# Patient Record
Sex: MALE | Race: WHITE | URBAN - NONMETROPOLITAN AREA
[De-identification: names, ages, dates, MRNs, and addresses within clinical notes are randomized per-mention and may not be internally consistent; named-entity substitution may affect disease eponyms.]

---

## 2019-07-25 ENCOUNTER — IMPORTED ENCOUNTER (OUTPATIENT)
Dept: URBAN - NONMETROPOLITAN AREA CLINIC 1 | Facility: CLINIC | Age: 49
End: 2019-07-25

## 2019-07-25 PROBLEM — H52.221: Noted: 2019-07-25

## 2019-07-25 PROBLEM — H52.13: Noted: 2019-07-25

## 2019-07-25 PROCEDURE — 92015 DETERMINE REFRACTIVE STATE: CPT

## 2019-07-25 PROCEDURE — 92310 CONTACT LENS FITTING OU: CPT

## 2019-07-25 PROCEDURE — 92014 COMPRE OPH EXAM EST PT 1/>: CPT

## 2019-07-25 NOTE — PATIENT DISCUSSION
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

## 2022-04-10 ASSESSMENT — TONOMETRY
OD_IOP_MMHG: 14
OS_IOP_MMHG: 13

## 2022-04-10 ASSESSMENT — VISUAL ACUITY
OD_SC: 20/20
OS_SC: 20/20
OU_SC: J1+